# Patient Record
Sex: FEMALE | Race: WHITE | NOT HISPANIC OR LATINO | Employment: FULL TIME | URBAN - METROPOLITAN AREA
[De-identification: names, ages, dates, MRNs, and addresses within clinical notes are randomized per-mention and may not be internally consistent; named-entity substitution may affect disease eponyms.]

---

## 2017-05-29 ENCOUNTER — HISTORICAL (OUTPATIENT)
Dept: ADMINISTRATIVE | Facility: HOSPITAL | Age: 47
End: 2017-05-29

## 2017-05-29 LAB
ABS NEUT (OLG): 2.74 X10(3)/MCL (ref 2.1–9.2)
BASOPHILS # BLD AUTO: 0 X10(3)/MCL (ref 0–0.2)
BASOPHILS NFR BLD AUTO: 1 %
BUN SERPL-MCNC: 16 MG/DL (ref 7–18)
CALCIUM SERPL-MCNC: 8.7 MG/DL (ref 8.5–10.1)
CHLORIDE SERPL-SCNC: 106 MMOL/L (ref 98–107)
CHOLEST SERPL-MCNC: 208 MG/DL (ref 0–200)
CHOLEST/HDLC SERPL: 2.6 {RATIO} (ref 0–4)
CO2 SERPL-SCNC: 29 MMOL/L (ref 21–32)
CREAT SERPL-MCNC: 0.83 MG/DL (ref 0.55–1.02)
CREAT UR-MCNC: 182 MG/DL
EOSINOPHIL # BLD AUTO: 0.5 X10(3)/MCL (ref 0–0.9)
EOSINOPHIL NFR BLD AUTO: 10 %
ERYTHROCYTE [DISTWIDTH] IN BLOOD BY AUTOMATED COUNT: 12.3 % (ref 11.5–17)
EST. AVERAGE GLUCOSE BLD GHB EST-MCNC: 105 MG/DL
GLUCOSE SERPL-MCNC: 80 MG/DL (ref 74–106)
HBA1C MFR BLD: 5.3 % (ref 4.2–6.3)
HCT VFR BLD AUTO: 39 % (ref 37–47)
HDLC SERPL-MCNC: 80 MG/DL (ref 35–60)
HGB BLD-MCNC: 12.7 GM/DL (ref 12–16)
LDLC SERPL CALC-MCNC: 116 MG/DL (ref 0–129)
LYMPHOCYTES # BLD AUTO: 1.2 X10(3)/MCL (ref 0.6–4.6)
LYMPHOCYTES NFR BLD AUTO: 24 %
MCH RBC QN AUTO: 29.2 PG (ref 27–31)
MCHC RBC AUTO-ENTMCNC: 32.6 GM/DL (ref 33–36)
MCV RBC AUTO: 89.7 FL (ref 80–94)
MICROALBUMIN UR-MCNC: 0.8 MG/DL
MICROALBUMIN/CREAT RATIO PNL UR: 4.4 MG/GM CR (ref 0–30)
MONOCYTES # BLD AUTO: 0.5 X10(3)/MCL (ref 0.1–1.3)
MONOCYTES NFR BLD AUTO: 10 %
NEUTROPHILS # BLD AUTO: 2.74 X10(3)/MCL (ref 2.1–9.2)
NEUTROPHILS NFR BLD AUTO: 56 %
PLATELET # BLD AUTO: 231 X10(3)/MCL (ref 130–400)
PMV BLD AUTO: 11 FL (ref 9.4–12.4)
POTASSIUM SERPL-SCNC: 4.6 MMOL/L (ref 3.5–5.1)
RBC # BLD AUTO: 4.35 X10(6)/MCL (ref 4.2–5.4)
SODIUM SERPL-SCNC: 143 MMOL/L (ref 136–145)
TRIGL SERPL-MCNC: 59 MG/DL (ref 30–150)
TSH SERPL-ACNC: 2.02 MIU/ML (ref 0.36–3.74)
VLDLC SERPL CALC-MCNC: 12 MG/DL
WBC # SPEC AUTO: 4.9 X10(3)/MCL (ref 4.5–11.5)

## 2018-08-07 ENCOUNTER — HOSPITAL ENCOUNTER (OUTPATIENT)
Dept: MEDSURG UNIT | Facility: HOSPITAL | Age: 48
End: 2018-08-07

## 2018-08-07 LAB
ABS NEUT (OLG): 7.98 X10(3)/MCL (ref 2.1–9.2)
ALBUMIN SERPL-MCNC: 3.9 GM/DL (ref 3.4–5)
ALBUMIN/GLOB SERPL: 1 RATIO (ref 1.1–2)
ALP SERPL-CCNC: 43 UNIT/L (ref 38–126)
ALT SERPL-CCNC: 27 UNIT/L (ref 12–78)
APPEARANCE, UA: CLEAR
AST SERPL-CCNC: 24 UNIT/L (ref 15–37)
BACTERIA SPEC CULT: ABNORMAL /HPF
BASOPHILS # BLD AUTO: 0.1 X10(3)/MCL (ref 0–0.2)
BASOPHILS NFR BLD AUTO: 1 %
BILIRUB SERPL-MCNC: 0.4 MG/DL (ref 0.2–1)
BILIRUB UR QL STRIP: NEGATIVE
BILIRUBIN DIRECT+TOT PNL SERPL-MCNC: 0.1 MG/DL (ref 0–0.5)
BILIRUBIN DIRECT+TOT PNL SERPL-MCNC: 0.3 MG/DL (ref 0–0.8)
BUN SERPL-MCNC: 11 MG/DL (ref 7–18)
CALCIUM SERPL-MCNC: 9.1 MG/DL (ref 8.5–10.1)
CHLORIDE SERPL-SCNC: 107 MMOL/L (ref 98–107)
CO2 SERPL-SCNC: 27 MMOL/L (ref 21–32)
COLOR UR: YELLOW
CREAT SERPL-MCNC: 0.88 MG/DL (ref 0.55–1.02)
EOSINOPHIL # BLD AUTO: 0.8 X10(3)/MCL (ref 0–0.9)
EOSINOPHIL NFR BLD AUTO: 7 %
ERYTHROCYTE [DISTWIDTH] IN BLOOD BY AUTOMATED COUNT: 11.9 % (ref 11.5–17)
GLOBULIN SER-MCNC: 3.8 GM/DL (ref 2.4–3.5)
GLUCOSE (UA): NEGATIVE
GLUCOSE SERPL-MCNC: 97 MG/DL (ref 74–106)
HCT VFR BLD AUTO: 42.8 % (ref 37–47)
HGB BLD-MCNC: 13.8 GM/DL (ref 12–16)
HGB UR QL STRIP: NEGATIVE
KETONES UR QL STRIP: NEGATIVE
LEUKOCYTE ESTERASE UR QL STRIP: NEGATIVE
LIPASE SERPL-CCNC: 132 UNIT/L (ref 73–393)
LYMPHOCYTES # BLD AUTO: 1.3 X10(3)/MCL (ref 0.6–4.6)
LYMPHOCYTES NFR BLD AUTO: 12 %
MCH RBC QN AUTO: 29.2 PG (ref 27–31)
MCHC RBC AUTO-ENTMCNC: 32.2 GM/DL (ref 33–36)
MCV RBC AUTO: 90.7 FL (ref 80–94)
MONOCYTES # BLD AUTO: 0.9 X10(3)/MCL (ref 0.1–1.3)
MONOCYTES NFR BLD AUTO: 8 %
NEUTROPHILS # BLD AUTO: 7.98 X10(3)/MCL (ref 1.4–7.9)
NEUTROPHILS NFR BLD AUTO: 72 %
NITRITE UR QL STRIP: NEGATIVE
PH UR STRIP: 7.5 [PH] (ref 5–9)
PLATELET # BLD AUTO: 207 X10(3)/MCL (ref 130–400)
PMV BLD AUTO: 11 FL (ref 9.4–12.4)
POTASSIUM SERPL-SCNC: 3.7 MMOL/L (ref 3.5–5.1)
PROT SERPL-MCNC: 7.7 GM/DL (ref 6.4–8.2)
PROT UR QL STRIP: NEGATIVE
RBC # BLD AUTO: 4.72 X10(6)/MCL (ref 4.2–5.4)
RBC #/AREA URNS HPF: ABNORMAL /[HPF]
SODIUM SERPL-SCNC: 140 MMOL/L (ref 136–145)
SP GR UR STRIP: 1.08 (ref 1–1.03)
SQUAMOUS EPITHELIAL, UA: ABNORMAL
UROBILINOGEN UR STRIP-ACNC: 1
WBC # SPEC AUTO: 11 X10(3)/MCL (ref 4.5–11.5)
WBC #/AREA URNS HPF: ABNORMAL /[HPF]

## 2020-05-05 ENCOUNTER — HISTORICAL (OUTPATIENT)
Dept: LAB | Facility: HOSPITAL | Age: 50
End: 2020-05-05

## 2022-04-30 NOTE — ED PROVIDER NOTES
Patient:   Conchita Casarez            MRN: [id number]            FIN: [id number]               Age:   47 years     Sex:  Female     :  1970   Associated Diagnoses:   Pain of upper abdomen; Cholecystitis   Author:   Byron PIERCE, Toan FISH      Basic Information   Time seen: Immediately upon arrival.   History source: Patient.   History limitation: None.   Additional information: Chief Complaint from Nursing Triage Note : Chief Complaint   2018 4:18 CDT        Chief Complaint           abdominal pain started tonight around 10pm.  triggered by fatty foods. denies n/n/d.  left upper back pain.  .      History of Present Illness   The patient presents with Is a 47-year-old female complaining of abdominal pain- which is in her central upper abdomen.  She's had some nausea but no vomiting.  She had similar symptoms about 2 months ago - they were relieved with Bentyl but the patient is still and dietary modification but the patient still occasionally has symptoms.  She says she is tried follow-up with a gastroenterologist but does not have an appointment for several months..        Review of Systems   Constitutional symptoms:  Negative except as documented in HPI.   Skin symptoms   ENMT symptoms:  Negative except as documented in HPI.   Respiratory symptoms:  Negative except as documented in HPI.   Cardiovascular symptoms:  Negative except as documented in HPI.   Gastrointestinal symptoms:  Negative except as documented in HPI.   Musculoskeletal symptoms:  Negative except as documented in HPI.   Neurologic symptoms:  Negative except as documented in HPI.             Additional review of systems information: All other systems reviewed and otherwise negative.      Health Status   Allergies:    Allergic Reactions (Selected)  Severity Not Documented  Erythromycin- Hives.,    Allergies (1) Active Reaction  erythromycin Hives  .      Past Medical/ Family/ Social History   Family history:    Cardiac  arrest.  Mother  Thyroid disease.  Mother  .   Social history:    Social & Psychosocial Habits    Alcohol  09/01/2015  Use: Never    Employment/School  09/01/2015  Status: Employed    Exercise  09/01/2015  Times per week: 1-2 times/week    Home/Environment  09/01/2015  Living situation: Home/Independent    Nutrition/Health  09/01/2015  Type of diet: Regular    Substance Abuse  09/01/2015  Use: Never    Tobacco  09/01/2015  Use: Never smoker  .   Problem list:    Active Problems (4)  Fatigue   Hot flushes, perimenopausal   Thyromegaly   Wellness treatments and procedures   .      Physical Examination               Vital Signs   Measurements   8/7/2018 4:18 CDT        Weight Dosing             56.5 kg                             Weight Measured and Calculated in Lbs     124.56 lb                             Weight Estimated          56.5 kg                             Height/Length Estimated   162.56 cm                             Body Mass Index Estimated 21.38 kg/m2  .   General:  Alert, no acute distress, well appearing, conversant.    Skin:  Warm, dry, intact.    Head:  Normocephalic, atraumatic.    Neck:  Supple, trachea midline.    Eye:  Pupils are equal, round and reactive to light, extraocular movements are intact, normal conjunctiva.    Ears, nose, mouth and throat:  Oral mucosa moist.   Cardiovascular:  Regular rate and rhythm, No murmur, Normal peripheral perfusion, No edema.    Respiratory:  Lungs are clear to auscultation, respirations are non-labored, breath sounds are equal, Symmetrical chest wall expansion.    Chest wall:  No tenderness.   Musculoskeletal:  Normal ROM, normal strength, no tenderness, no swelling, no deformity.    Gastrointestinal:  Soft, Non distended, Tenderness: Moderate, epigastric.    Neurological:  Alert and oriented to person, place, time, and situation, No focal neurological deficit observed, CN II-XII intact, normal sensory observed, normal motor observed, normal speech  observed, normal coordination observed.    Psychiatric:  Cooperative, appropriate mood & affect, normal judgment.       Medical Decision Making   Orders  Launch Orders   Admit/Transfer/Discharge:  Admit as Inpatient (Modify): 8/7/2018 10:00 CDT, Juliane Monterroso MD, Quirino MARINELLI Post-Op, Yes  .   Results review:  Lab results : Lab View   8/7/2018 5:00 CDT        UA Appear                 CLEAR                             UA Color                  YELLOW                             UA Spec Grav              1.076  HI                             UA Bili                   Negative                             UA pH                     7.5                             UA Urobilinogen           1.0                             UA Blood                  Negative                             UA Glucose                Negative                             UA Ketones                Negative                             UA Protein                Negative                             UA Nitrite                Negative                             UA Leuk Est               Negative                             UA WBC                    NONE SEEN                             UA RBC                    NONE SEEN                             UA Bacteria               NONE SEEN /HPF                             UA Squam Epithelial       NONE SEEN    8/7/2018 4:46 CDT        Sodium Lvl                140 mmol/L                             Potassium Lvl             3.7 mmol/L                             Chloride                  107 mmol/L                             CO2                       27.0 mmol/L                             Calcium Lvl               9.1 mg/dL                             Glucose Lvl               97 mg/dL                             BUN                       11.0 mg/dL                             Creatinine                0.88 mg/dL                             eGFR-AA                   >60 mL/min/1.73 m2  NA                              eGFR-EMILY                  >60 mL/min/1.73 m2  NA                             Bili Total                0.4 mg/dL                             Bili Direct               0.10 mg/dL                             Bili Indirect             0.30 mg/dL                             AST                       24 unit/L                             ALT                       27 unit/L                             Alk Phos                  43 unit/L                             Total Protein             7.7 gm/dL                             Albumin Lvl               3.90 gm/dL                             Globulin                  3.80 gm/dL  HI                             A/G Ratio                 1.0 ratio  LOW                             Lipase Lvl                132 unit/L                             WBC                       11.0 x10(3)/mcL                             RBC                       4.72 x10(6)/mcL                             Hgb                       13.8 gm/dL                             Hct                       42.8 %                             Platelet                  207 x10(3)/mcL                             MCV                       90.7 fL                             MCH                       29.2 pg                             MCHC                      32.2 gm/dL  LOW                             RDW                       11.9 %                             MPV                       11.0 fL                             Abs Neut                  7.98 x10(3)/mcL                             Neutro Auto               72 %  NA                             Lymph Auto                12 %  NA                             Mono Auto                 8 %  NA                             Eos Auto                  7 %  NA                             Abs Eos                   0.8 x10(3)/mcL                             Basophil Auto             1 %  NA                             Abs Neutro                7.98 x10(3)/mcL  HI                              Abs Lymph                 1.3 x10(3)/mcL                             Abs Mono                  0.9 x10(3)/mcL                             Abs Baso                  0.1 x10(3)/mcL        Results review::  Lab results : Lab View   8/7/2018 4:46 CDT        Sodium Lvl                140 mmol/L                             Potassium Lvl             3.7 mmol/L                             Chloride                  107 mmol/L                             CO2                       27.0 mmol/L                             Calcium Lvl               9.1 mg/dL                             Glucose Lvl               97 mg/dL                             BUN                       11.0 mg/dL                             Creatinine                0.88 mg/dL                             eGFR-AA                   >60 mL/min/1.73 m2  NA                             eGFR-EMILY                  >60 mL/min/1.73 m2  NA                             Bili Total                0.4 mg/dL                             Bili Direct               0.10 mg/dL                             Bili Indirect             0.30 mg/dL                             AST                       24 unit/L                             ALT                       27 unit/L                             Alk Phos                  43 unit/L                             Total Protein             7.7 gm/dL                             Albumin Lvl               3.90 gm/dL                             Globulin                  3.80 gm/dL  HI                             A/G Ratio                 1.0 ratio  LOW                             Lipase Lvl                132 unit/L                             WBC                       11.0 x10(3)/mcL                             RBC                       4.72 x10(6)/mcL                             Hgb                       13.8 gm/dL                             Hct                       42.8 %                             Platelet                   207 x10(3)/mcL                             MCV                       90.7 fL                             MCH                       29.2 pg                             MCHC                      32.2 gm/dL  LOW                             RDW                       11.9 %                             MPV                       11.0 fL                             Abs Neut                  7.98 x10(3)/mcL                             Neutro Auto               72 %  NA                             Lymph Auto                12 %  NA                             Mono Auto                 8 %  NA                             Eos Auto                  7 %  NA                             Abs Eos                   0.8 x10(3)/mcL                             Basophil Auto             1 %  NA                             Abs Neutro                7.98 x10(3)/mcL  HI                             Abs Lymph                 1.3 x10(3)/mcL                             Abs Mono                  0.9 x10(3)/mcL                             Abs Baso                  0.1 x10(3)/mcL  .    Radiology results:  Rad Results (ST)  < 12 hrs   Accession: [id number]  Order: US Abdomen Limited  Report Dt/Tm: 08/07/2018 09:21  Report:   TECHNIQUE   Grayscale sonographic evaluation of the right upper quadrant was  performed, with focused Doppler assessment of the main portal vein.     INDICATION   Abdominal Pain     Comparison: No similar modality comparison is available. The  abdominopelvic CT performed earlier the same day was reviewed.     FINDINGS   The liver is normal in size. No convincing focal mass is identified on  the supplied images. The background hepatic echogenicity is  unremarkable in reference to the adjacent right kidney. There is no  intrahepatic biliary ductal dilatation.   The main portal vein demonstrates normal antegrade flow and waveform  phasicity.      As noted on the above-referenced CT, there are 2 stones within  the  gallbladder lumen, measuring approximately 1.5 cm in diameter. Both  stones are present within the gallbladder fundus, with no evidence of  impacted stone at the neck appreciated. The gallbladder is moderately  distended, measuring approximately 9.1 cm in length and 3.8 cm in  diameter. There is mild prominence of the gallbladder wall, measuring  up to 3 mm in thickness. Pericholecystic fluid is noted. The  sonographic Nicholas assessment is possible.  No common bile duct dilatation, intraductal abnormality, or extrinsic  compression is identified.     Limited views of the pancreas, right kidney, aorta, and IVC are  sonographically unremarkable.  No free abdominal fluid is evident.     IMPRESSION   1.  Cholelithiasis with sonographic features of acute cholecystitis.  2.  Moderate dilatation of the gallbladder lumen is noted. There is no  convincing evidence of impacted/obstructing stone in the region of the  neck. No findings of choledocholithiasis or common bile duct  obstruction are appreciated, although assessment of the distal CBD is  limited by modality.      Accession: [id number]  Order: CT Abdomen and Pelvis W Contrast  Report Dt/Tm: 08/07/2018 08:28  Report:   TECHNIQUE  Helical-acquisition CT images of the abdomen and pelvis were obtained  following the intravenous administration of iodinated contrast media.  Oral contrast was not utilized.  Multiplanar reformats were accomplished by a CT technologist at a  separate workstation and pushed to PACS for physician review.     Total DLP (mGy-cm): 648 (value may include radiation due to  concomitantly performed CT imaging)  Automated tube current modulation and/or weight-based exposure dosing  is utilized, when appropriate, to reach lowest reasonably achievable  exposure rate.     INDICATION  Recent onset of abdominal pain     Comparison: No similar modality comparisons are available at the time  of exam interpretation.     FINDINGS  Images were reviewed in  soft tissue, lung, and bone windows.     The visualized cardiac structures are unremarkable in appearance.  The lung bases are without focal or acute abnormality. No pleural  effusion or pneumothorax is appreciated.     There is a partially calcified stone within the gallbladder neck, as  well as a second stone at the dependent fundus. Associated increased  conspicuity of the gallbladder wall is noted, as well as  pericholecystic fluid. There is mild dilatation of the  intra-/extrahepatic bile ducts. No definite focal intraluminal  irregularity or duct contour abnormality is identified.  There is no acute or concerning abnormality of the upper abdominal  solid organs. Bilateral, simple-appearing renal cysts are incidentally  noted. No large urolithiasis or evidence of distal obstructive  uropathy is appreciated.  The urinary bladder is unremarkable. The uterus is prominent in size  and heterogeneous, with focal hypoattenuating lesion at the posterior  wall in keeping with fibroid. A circumscribed fluid-attenuation  structure at the left adnexa is compatible with dominant follicle. No  large cysts or concerning solid adnexal lesion is appreciated.     There is no evidence of high-grade gastric outlet or small bowel  obstruction. Copious stool is present throughout the colon, in keeping  with constipation. No convincing focal gastrointestinal lesion is  appreciated. There is questionable identification of a  normal-appearing appendix at the right lower abdomen, near midline; no  definite secondary evidence of acute appendicitis or periappendiceal  fluid/collection is appreciated.  No free intra-abdominal air or fluid is evident.     The abdominopelvic vasculature is normal in caliber and contour.  Scattered mural plaquing and calcification are noted.  Typical appearance of symmetric bilateral renal vein opacification and  contrast flow into the IVC is noted. However, there is reflux of  contrast into the inferior IVC  and bilateral iliac venous structures.  This is nonspecific, but can suggest component of cardiac dysfunction  or venous insufficiency.  No pathologic lymphadenopathy is appreciated.     The body wall soft tissues are without acute or focal finding.  Bilateral breast implants are partially visualized, unremarkable in  appearance.  Degenerative changes are present of the visualized spine and pelvis.  No acute or aggressive osseous lesion is identified.     IMPRESSION   1.  Findings in keeping with cholelithiasis and acute cholecystitis.  No definite evidence of biliary obstruction is identified.  2.  Extensive stool throughout the colon is suggestive of  constipation.  3.  Posterior mural fibroid. Adjacent left dominant follicle versus  miniscule ovarian cyst is simple and benign in appearance.     Additional chronic/incidental details provided above.        .      Impression and Plan   Diagnosis   Pain of upper abdomen (IQB75-VG R10.10)   Cholecystitis (UAV69-OR K81.9)      Calls-Consults   -  8/7/2018 09:30:00 , Surgical hospitalist, Will admit patient for cholecystectomy.    Plan   Condition: Stable.    Disposition: Admit time  8/7/2018 09:45:00, Admit to Surgery, Juliane Monterroso MD, Quirino MARINELLI, Patient care transitioned to: Time: 8/7/2018 06:01:00, José PIERCE, Venkat Beckford.    Counseled: Patient, Regarding diagnosis, Regarding diagnostic results, Regarding treatment plan, Patient indicated understanding of instructions.

## 2022-04-30 NOTE — OP NOTE
DATE OF SURGERY:    08/07/2018    SURGEON:  Quirino Cardozo Jr., MD  ASSISTANT:  Shaji Wolff MD PGY4, Dani Tierney MD PGY2    PREOPERATIVE DIAGNOSES:    1. Acute cholecystitis.  2. Cholelithiasis.    POSTOPERATIVE DIAGNOSES:    1. Acute cholecystitis.  2. Cholelithiasis.    PROCEDURE PERFORMED:  Laparoscopic cholecystectomy.    ANESTHESIA:  General endotracheal.    ESTIMATED BLOOD LOSS:  Less than 20 mL.    FLUID REPLACEMENT:  Please refer to anesthesia record for volume of crystalloid infused.    SPECIMENS:  Gallbladder with stones.    COUNTS:  Correct.    COMPLICATIONS:  None apparent.    CONDITION:  Patient transferred to Post-Anesthesia Care Unit in stable condition having tolerated the procedure well.    INDICATIONS FOR MEDICAL NECESSITY:  Ms. Conchita Casarez is a 47-year-old female who presents with a several month history of postprandial abdominal pain.  She underwent workup which demonstrated cholelithiasis.  She has had difficulties with worsening pain.  She has been evaluated in the emergency room in the past and re-presented today with complaints of upper abdominal pain.  Workup demonstrated a distended, thickened gallbladder with pericholecystic fluid.  Her liver function tests and lipase were noted to be normal.  White blood cell count was noted to be normal.  On examination, she demonstrated a positive Nicholas's sign with fullness in the right upper quadrant with clinical concern for acute cholecystitis.  The option of laparoscopic versus open cholecystectomy was reviewed with   Ms. Casarez.  The risks of the procedure including, but not limited to, bleeding, infection, scarring, damage to the common bile duct, retained stones, post-cholecystectomy diarrhea, and the possibility of further treatments and procedures as well as abdominal pain were all discussed preprocedure. Her questions were answered.  She appeared to understand our discussion and agreed to proceed with laparoscopic versus open  cholecystectomy as outlined above.    PROCEDURE IN DETAIL:  After informed consent was obtained from the patient and after undergoing initial IV fluid and IV antibiotic resuscitation in the emergency room, she was subsequently taken to the operating room and placed on the operating table in supine position.  She underwent general endotracheal anesthesia by Anesthesiology Department.  The patient's pressure points were padded.  Her lower extremities were placed in sequential compression devices.  Her abdomen was prepped and draped in the standard surgical sterile fashion.  Initially, after appropriate timeout was completed, the infraumbilical region was infiltrated with 0.25% Marcaine for postoperative pain control.  An infraumbilical 5 mm incision was made using both sharp and blunt dissection.  The umbilicus was elevated superiorly by way of the penetrating towel clamp.  The Veress needle was placed through the infraumbilical skin wound into the peritoneal cavity and then its placement was checked using saline drop test.  After adequate placement was documented, pneumoperitoneum was then instituted.  After adequate pneumoperitoneum was achieved, the blunt 5 mm trocar was placed through the infraumbilical skin wound into the peritoneal cavity.  Initial laparoscopic visualization did not demonstrate any evidence of complication of port placement.  The patient was placed in the reverse Trendelenburg position with left side down for better visualization of right upper quadrant.  The remaining port sites were infiltrated with 0.25% Marcaine of which an 11 mm trocar was placed in the subxiphoid position and two 5 mm trocars were placed in the right subcostal margin all under laparoscopic visualization without evidence of complication.  The gallbladder was noted to be distended, thickened, and edematous consistent with acute cholecystitis. It was difficult to grasp.  Therefore, the needle aspirator was used to decompress  the gallbladder of bile.  The gallbladder was retracted both superiorly and laterally for exposure of the triangle of Calot. The cystic duct and cystic artery were dissected into the field and, after a full 360 degree view was able to be obtained, 3 surgical clips were placed in the proximal aspect of the cystic duct, along the distal aspect of cystic duct.  Two surgical clips were placed in the proximal aspect cystic artery and then 1 on the distal aspect of the cystic artery.  The cystic duct and cystic artery were then transected between the prior placed clips.  The gallbladder was then dissected from the gallbladder fossa using electrocautery.  A posterior cystic artery was identified.  This was ligated using a single clip and electrocautery.  The gallbladder was completely dissected, placed into an Endobag, and removed from the patient's abdomen through the subxiphoid fascial defect.  The gallbladder fossa was evaluated. Hemostasis was achieved using electrocautery.  The right upper quadrant was irrigated with saline and suctioned dry.  No blood or bile could be demonstrated.  The fascial defect of the subxiphoid wound was reapproximated using 0 Vicryl with Endoclose suture passer.  All trocars were removed without evidence of bleeding.  The pneumoperitoneum was evacuated.  All skin wounds were then reapproximated using 4-0 Monocryl in a subcuticular fashion.  The wounds were cleaned, dried, and then sterile bandages were applied.  The patient was subsequently reversed from anesthesia and transferred     to the Post-Anesthesia Care Unit in stable condition having tolerated the procedure well.  All needle, instrument, and sponge counts were correct at the end of the case.        ______________________________  MD HAILEY Macias Jr./ALEKSEY  DD:  08/07/2018  Time:  01:07PM  DT:  08/07/2018  Time:  01:38PM  Job #:  [id number]

## 2022-04-30 NOTE — DISCHARGE SUMMARY
Patient:   Conchita Casarez            MRN: [id number]            FIN: [id number]               Age:   47 years     Sex:  Female     :  1970   Associated Diagnoses:   Abdominal pain; Cholecystitis; Pain of upper abdomen   Author:   Mack Harman      Results Review   General results   New results : ResultsNew (never reviewed)   2018 5:00 CDT        UA Appear                 CLEAR                             UA Color                  YELLOW                             UA Spec Grav              1.076  HI                             UA Bili                   Negative                             UA pH                     7.5                             UA Urobilinogen           1.0                             UA Blood                  Negative                             UA Glucose                Negative                             UA Ketones                Negative                             UA Protein                Negative                             UA Nitrite                Negative                             UA Leuk Est               Negative                             UA WBC                    NONE SEEN                             UA RBC                    NONE SEEN                             UA Bacteria               NONE SEEN /HPF                             UA Squam Epithelial       NONE SEEN    2018 4:46 CDT        Sodium Lvl                140 mmol/L                             Potassium Lvl             3.7 mmol/L                             Chloride                  107 mmol/L                             CO2                       27.0 mmol/L                             Calcium Lvl               9.1 mg/dL                             Glucose Lvl               97 mg/dL                             BUN                       11.0 mg/dL                             Creatinine                0.88 mg/dL                             eGFR-AA                   >60 mL/min/1.73 m2   NA                             eGFR-EMILY                  >60 mL/min/1.73 m2  NA                             Bili Total                0.4 mg/dL                             Bili Direct               0.10 mg/dL                             Bili Indirect             0.30 mg/dL                             AST                       24 unit/L                             ALT                       27 unit/L                             Alk Phos                  43 unit/L                             Total Protein             7.7 gm/dL                             Albumin Lvl               3.90 gm/dL                             Globulin                  3.80 gm/dL  HI                             A/G Ratio                 1.0 ratio  LOW                             Lipase Lvl                132 unit/L                             WBC                       11.0 x10(3)/mcL                             RBC                       4.72 x10(6)/mcL                             Hgb                       13.8 gm/dL                             Hct                       42.8 %                             Platelet                  207 x10(3)/mcL                             MCV                       90.7 fL                             MCH                       29.2 pg                             MCHC                      32.2 gm/dL  LOW                             RDW                       11.9 %                             MPV                       11.0 fL                             Abs Neut                  7.98 x10(3)/mcL                             Neutro Auto               72 %  NA                             Lymph Auto                12 %  NA                             Mono Auto                 8 %  NA                             Eos Auto                  7 %  NA                             Abs Eos                   0.8 x10(3)/mcL                             Basophil Auto             1 %  NA                             Abs Neutro                 7.98 x10(3)/mcL  HI                             Abs Lymph                 1.3 x10(3)/mcL                             Abs Mono                  0.9 x10(3)/mcL                             Abs Baso                  0.1 x10(3)/mcL        Discharge Information   Discharge Summary Information     Admitted  8/7/2018.     Discharged  8/7/2018.     Abdominal pain (PNED [id number]).     Cholecystitis (WNJ93-AF K81.9).     Pain of upper abdomen (HGJ77-WH R10.10).        Physical Examination   Vital signs   Within normal limits.     General   Within normal limits.     Alert and oriented X3.     No acute distress.     Cardiovascular   Within normal limits.     Respiratory   Within normal limits.     Gastrointestinal   aTTP. .     Upper extremity musculoskeletal   Within normal limits.     Back/ Torso musculoskeletal   Within normal limits.     Lower extremity musculoskeletal   Within normal limits.     Integumentary   Within normal limits.     Neurologic   Within normal limits.        Hospital Course   Admitted for acute cholecystitis. Taken to OR and had uncomplicated lap todd. Discharged after all typical DC criteria met. FU as scheduled.       Discharge Plan   Discharge Summary Plan   Discharge Status: improved.     Discharge Disposition: discharge to home (into the care of family member, self care).

## 2022-07-06 ENCOUNTER — OFFICE VISIT (OUTPATIENT)
Dept: URGENT CARE | Facility: CLINIC | Age: 52
End: 2022-07-06
Payer: COMMERCIAL

## 2022-07-06 VITALS
SYSTOLIC BLOOD PRESSURE: 119 MMHG | RESPIRATION RATE: 16 BRPM | WEIGHT: 120 LBS | HEART RATE: 67 BPM | DIASTOLIC BLOOD PRESSURE: 66 MMHG | OXYGEN SATURATION: 99 % | HEIGHT: 64 IN | BODY MASS INDEX: 20.49 KG/M2 | TEMPERATURE: 98 F

## 2022-07-06 DIAGNOSIS — Z00.00 PHYSICAL EXAM: Primary | ICD-10-CM

## 2022-07-06 PROCEDURE — 99499 UNLISTED E&M SERVICE: CPT | Mod: CSM,S$GLB,, | Performed by: NURSE PRACTITIONER

## 2022-07-06 PROCEDURE — 99499 UNLISTED E&M SERVICE: CPT | Mod: S$GLB,,, | Performed by: NURSE PRACTITIONER

## 2022-07-06 PROCEDURE — 99499 NO LOS: ICD-10-PCS | Mod: S$GLB,,, | Performed by: NURSE PRACTITIONER

## 2022-07-06 NOTE — PROGRESS NOTES
"Subjective:       Patient ID: Conchita Truong is a 51 y.o. female.    Vitals:  height is 5' 4" (1.626 m) and weight is 54.4 kg (120 lb). Her temperature is 98.4 °F (36.9 °C). Her blood pressure is 119/66 and her pulse is 67. Her respiration is 16 and oxygen saturation is 99%.     Chief Complaint: immunization form sign    Immunization record signed for School    ROS    Objective:      Physical Exam      Assessment:       No diagnosis found.      Plan:         There are no diagnoses linked to this encounter.               "

## 2023-10-02 ENCOUNTER — OFFICE VISIT (OUTPATIENT)
Dept: URGENT CARE | Facility: CLINIC | Age: 53
End: 2023-10-02
Payer: COMMERCIAL

## 2023-10-02 VITALS
SYSTOLIC BLOOD PRESSURE: 113 MMHG | DIASTOLIC BLOOD PRESSURE: 60 MMHG | TEMPERATURE: 101 F | HEART RATE: 99 BPM | WEIGHT: 120.81 LBS | BODY MASS INDEX: 20.74 KG/M2 | RESPIRATION RATE: 20 BRPM | OXYGEN SATURATION: 96 %

## 2023-10-02 DIAGNOSIS — B34.9 VIRAL ILLNESS: ICD-10-CM

## 2023-10-02 DIAGNOSIS — R52 BODY ACHES: Primary | ICD-10-CM

## 2023-10-02 DIAGNOSIS — U07.1 SARS-COV-2 POSITIVE: ICD-10-CM

## 2023-10-02 LAB
CTP QC/QA: YES
SARS-COV-2 AG RESP QL IA.RAPID: POSITIVE

## 2023-10-02 PROCEDURE — 87811 SARS-COV-2 COVID19 W/OPTIC: CPT | Mod: QW,S$GLB,, | Performed by: FAMILY MEDICINE

## 2023-10-02 PROCEDURE — 99214 PR OFFICE/OUTPT VISIT, EST, LEVL IV, 30-39 MIN: ICD-10-PCS | Mod: S$GLB,,, | Performed by: FAMILY MEDICINE

## 2023-10-02 PROCEDURE — 87811 SARS CORONAVIRUS 2 ANTIGEN POCT, MANUAL READ: ICD-10-PCS | Mod: QW,S$GLB,, | Performed by: FAMILY MEDICINE

## 2023-10-02 PROCEDURE — 99214 OFFICE O/P EST MOD 30 MIN: CPT | Mod: S$GLB,,, | Performed by: FAMILY MEDICINE

## 2023-10-02 RX ORDER — NIRMATRELVIR AND RITONAVIR 300-100 MG
KIT ORAL
Qty: 30 TABLET | Refills: 0 | Status: SHIPPED | OUTPATIENT
Start: 2023-10-02 | End: 2023-10-07

## 2023-10-02 NOTE — PROGRESS NOTES
Subjective:      Patient ID: Conchita Truong is a 52 y.o. female.    Vitals:  weight is 54.8 kg (120 lb 13 oz). Her tympanic temperature is 101.3 °F (38.5 °C) (abnormal). Her blood pressure is 113/60 and her pulse is 99. Her respiration is 20 and oxygen saturation is 96%.     Chief Complaint: Headache    Presents with headache, body aches and chills. Symptoms present since 9/30/23. Has taken tylenol with no relief.       Headache   This is a new problem. The current episode started in the past 7 days. The problem occurs constantly. The pain does not radiate. The quality of the pain is described as aching. The patient is experiencing no pain.       Neurological:  Positive for headaches.      Objective:     Physical Exam   Constitutional: She is oriented to person, place, and time.  Non-toxic appearance. She does not appear ill. No distress.   HENT:   Head: Normocephalic and atraumatic.   Nose: No rhinorrhea or congestion.   Eyes: Conjunctivae are normal. Pupils are equal, round, and reactive to light. Extraocular movement intact   Cardiovascular: Normal rate and regular rhythm.   Pulmonary/Chest: Effort normal and breath sounds normal.   Abdominal: Normal appearance.   Lymphadenopathy:     She has no cervical adenopathy.   Neurological: no focal deficit. She is alert and oriented to person, place, and time.   Skin: Skin is warm. Capillary refill takes less than 2 seconds.   Nursing note and vitals reviewed.      Assessment:     1. Body aches    2. Viral illness    3. SARS-CoV-2 positive        Plan:       Body aches  -     SARS Coronavirus 2 Antigen, POCT Manual Read    Viral illness  -     nirmatrelvir-ritonavir (PAXLOVID) 300 mg (150 mg x 2)-100 mg copackaged tablets (EUA); Take 3 tablets by mouth 2 (two) times daily. Each dose contains 2 nirmatrelvir (pink tablets) and 1 ritonavir (white tablet). Take all 3 tablets together  Dispense: 30 tablet; Refill: 0    SARS-CoV-2 positive  -     nirmatrelvir-ritonavir  (PAXLOVID) 300 mg (150 mg x 2)-100 mg copackaged tablets (EUA); Take 3 tablets by mouth 2 (two) times daily. Each dose contains 2 nirmatrelvir (pink tablets) and 1 ritonavir (white tablet). Take all 3 tablets together  Dispense: 30 tablet; Refill: 0        You tested positive for covid. Please isolate yourself for 5 days, today is day 1. Mask around people for 5 additional days after isolation  Rx paxlovid for 5 days. Take with food. Rest, hydration, over the counter medicine as needed: guaifenesin +/-dextromethorphan  for wet cough, tylenol or ibuprofen for fever, aches and pains, claritin/zyrtec/allegra/flonase for running nose, lozenges and warm water gaggles for sore throat. Saline nasal drop/spray for stuffy nose.  Red flag signs and indication  to ER visit discussed  Follow up with PCP for further concerns

## 2023-10-02 NOTE — PATIENT INSTRUCTIONS
You tested positive for covid. Please isolate yourself for 5 days, today is day 1. Mask around people for 5 additional days after isolation  Rx paxlovid for 5 days. Take with food. Rest, hydration, over the counter medicine as needed: guaifenesin +/-dextromethorphan  for wet cough, tylenol or ibuprofen for fever, aches and pains, claritin/zyrtec/allegra/flonase for running nose, lozenges and warm water gaggles for sore throat. Saline nasal drop/spray for stuffy nose.  Red flag signs and indication  to ER visit discussed  Follow up with PCP for further concerns

## 2023-10-04 ENCOUNTER — TELEPHONE (OUTPATIENT)
Dept: URGENT CARE | Facility: CLINIC | Age: 53
End: 2023-10-04
Payer: COMMERCIAL

## 2023-10-04 NOTE — TELEPHONE ENCOUNTER
Returned pt's call. Pt reports extreme body aches and no improvement with tylenol 500 mg q4-6h. She is still taking paxlovid as well.  Discussed with patient that body aches are very common with COVID.  Advised that she increase Tylenol dose to 1g of q8h and she can alternate with ibuprofen in between tylenol doses if needed.